# Patient Record
Sex: MALE | Race: WHITE | URBAN - METROPOLITAN AREA
[De-identification: names, ages, dates, MRNs, and addresses within clinical notes are randomized per-mention and may not be internally consistent; named-entity substitution may affect disease eponyms.]

---

## 2017-04-25 ENCOUNTER — PATIENT OUTREACH (OUTPATIENT)
Dept: ADMINISTRATIVE | Facility: HOSPITAL | Age: 62
End: 2017-04-25

## 2017-04-25 NOTE — LETTER
April 25, 2017    Andrei Gilliam  [address]  [place] [address]             [facility]  [address]  [facility] [address]  Phone: [phone] Dear Mr. Gilliam:    My name is Rhiannon and I am a Clinical Care Coordinator for Dr. Katarzyna Us. It has come to our attention that we have not seen you in quite some time. In addition to helping you feel better when you are sick, we are interested in preventing illness and injury in the first place. In the spirit of maintaining your good health, our system indicates that you are due for the following:     Annual exam and labs    I would like to help you schedule an office visit, or you may schedule the visit on your own via your [facility] patient portal. I may be reached at [phone], ext [phone].     If Dr. Us is no longer your PCP, please contact me so that we may update our records accordingly.    As always, we thank you for choosing [facility] for your healthcare needs and we look forward to seeing you in our office in the near future.  If you have any questions or concerns, please feel free to contact me.      Sincerely,     Rhiannon Thompson LPN  Clinical Care Coordinator  [facility]  [address] [place] [address] [phone], ext. [phone] FAX